# Patient Record
Sex: MALE | Race: WHITE
[De-identification: names, ages, dates, MRNs, and addresses within clinical notes are randomized per-mention and may not be internally consistent; named-entity substitution may affect disease eponyms.]

---

## 2018-01-20 ENCOUNTER — HOSPITAL ENCOUNTER (EMERGENCY)
Dept: HOSPITAL 47 - EC | Age: 38
Discharge: HOME | End: 2018-01-20
Payer: COMMERCIAL

## 2018-01-20 VITALS — HEART RATE: 88 BPM | RESPIRATION RATE: 16 BRPM

## 2018-01-20 VITALS — TEMPERATURE: 98.4 F

## 2018-01-20 VITALS — SYSTOLIC BLOOD PRESSURE: 101 MMHG | DIASTOLIC BLOOD PRESSURE: 59 MMHG

## 2018-01-20 DIAGNOSIS — J18.9: Primary | ICD-10-CM

## 2018-01-20 DIAGNOSIS — F17.200: ICD-10-CM

## 2018-01-20 PROCEDURE — 93005 ELECTROCARDIOGRAM TRACING: CPT

## 2018-01-20 PROCEDURE — 99284 EMERGENCY DEPT VISIT MOD MDM: CPT

## 2018-01-20 PROCEDURE — 71101 X-RAY EXAM UNILAT RIBS/CHEST: CPT

## 2018-01-20 PROCEDURE — 96372 THER/PROPH/DIAG INJ SC/IM: CPT
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
## 2018-01-20 NOTE — XR
EXAMINATION TYPE: XR ribs LT w pa chest x-ray , 5 VIEWS

 

DATE OF EXAM ORDERED: 1/20/2018

 

HISTORY: L sided pain, decrease BS on L.

 

COMPARISON: Previous chest x-ray dated 7/21/2009.

 

FINDINGS:  There is left basilar airspace disease. There is a small left effusion. The right lung is 
clear. The heart is not appear enlarged. No displaced rib fracture is seen.

 

IMPRESSION: 

1. LEFT BASILAR AIRSPACE DISEASE.

2. SMALL LEFT EFFUSION.

3. I DO NOT SEE A DISPLACED RIB FRACTURE AT THIS TIME.